# Patient Record
Sex: FEMALE | Race: WHITE | NOT HISPANIC OR LATINO | URBAN - METROPOLITAN AREA
[De-identification: names, ages, dates, MRNs, and addresses within clinical notes are randomized per-mention and may not be internally consistent; named-entity substitution may affect disease eponyms.]

---

## 2017-07-19 ENCOUNTER — HISTORICAL (OUTPATIENT)
Dept: ADMINISTRATIVE | Facility: HOSPITAL | Age: 32
End: 2017-07-19

## 2022-05-09 ENCOUNTER — OFFICE VISIT (OUTPATIENT)
Dept: ORTHOPEDICS | Facility: CLINIC | Age: 37
End: 2022-05-09
Payer: COMMERCIAL

## 2022-05-09 VITALS — BODY MASS INDEX: 30.82 KG/M2 | HEIGHT: 65 IN | WEIGHT: 185 LBS

## 2022-05-09 DIAGNOSIS — S52.572A OTHER CLOSED INTRA-ARTICULAR FRACTURE OF DISTAL END OF LEFT RADIUS, INITIAL ENCOUNTER: Primary | ICD-10-CM

## 2022-05-09 PROCEDURE — 3008F PR BODY MASS INDEX (BMI) DOCUMENTED: ICD-10-PCS | Mod: CPTII,,, | Performed by: ORTHOPAEDIC SURGERY

## 2022-05-09 PROCEDURE — 29085 APPL CAST HAND&LWR FOREARM: CPT | Mod: LT,,, | Performed by: ORTHOPAEDIC SURGERY

## 2022-05-09 PROCEDURE — 3008F BODY MASS INDEX DOCD: CPT | Mod: CPTII,,, | Performed by: ORTHOPAEDIC SURGERY

## 2022-05-09 PROCEDURE — 1159F PR MEDICATION LIST DOCUMENTED IN MEDICAL RECORD: ICD-10-PCS | Mod: CPTII,,, | Performed by: ORTHOPAEDIC SURGERY

## 2022-05-09 PROCEDURE — 1160F PR REVIEW ALL MEDS BY PRESCRIBER/CLIN PHARMACIST DOCUMENTED: ICD-10-PCS | Mod: CPTII,,, | Performed by: ORTHOPAEDIC SURGERY

## 2022-05-09 PROCEDURE — 1160F RVW MEDS BY RX/DR IN RCRD: CPT | Mod: CPTII,,, | Performed by: ORTHOPAEDIC SURGERY

## 2022-05-09 PROCEDURE — 1159F MED LIST DOCD IN RCRD: CPT | Mod: CPTII,,, | Performed by: ORTHOPAEDIC SURGERY

## 2022-05-09 PROCEDURE — 99203 OFFICE O/P NEW LOW 30 MIN: CPT | Mod: 25,,, | Performed by: ORTHOPAEDIC SURGERY

## 2022-05-09 PROCEDURE — 99203 PR OFFICE/OUTPT VISIT, NEW, LEVL III, 30-44 MIN: ICD-10-PCS | Mod: 25,,, | Performed by: ORTHOPAEDIC SURGERY

## 2022-05-09 PROCEDURE — 29085 PR APPLY HAND/WRIST CAST: ICD-10-PCS | Mod: LT,,, | Performed by: ORTHOPAEDIC SURGERY

## 2022-05-09 RX ORDER — NORETHINDRONE ACETATE AND ETHINYL ESTRADIOL AND FERROUS FUMARATE 1MG-20(21)
1 KIT ORAL DAILY
COMMUNITY
Start: 2022-03-07

## 2022-05-10 ENCOUNTER — PATIENT MESSAGE (OUTPATIENT)
Dept: ORTHOPEDICS | Facility: CLINIC | Age: 37
End: 2022-05-10
Payer: COMMERCIAL

## 2022-05-10 NOTE — PROGRESS NOTES
Subjective:    CC: Injury of the Left Wrist and Wrist Injury (pt fell last tuesday 5/3/22- went to [facility] urgent care and had xr done, pain level a 4 today, motrin prn)       HPI:  Patient presents to clinic for evaluation of left wrist pain.  Patient states that on 05/03/2022 she fell and injured her left wrist.  She went to the [place] urgent care the following day and had x-rays done that showed a minimally displaced comminuted intra-articular distal radius fracture.  She presents wearing a velcro brace.  Utilizing Motrin as needed.  She states she does still have some pain, bruising and swelling about the wrist and forearm.  She denies any numbness or tingling.  No previous surgeries or diagnoses to left wrist.    ROS: Refer to HPI for pertinent ROS. All other 12 point systems negative.    Objective:    There were no vitals filed for this visit.     Physical Exam:  Left upper extremity compartments are soft and warm.  Skin is intact.  There are no signs or symptoms of DVT or infection.  She does have some bruising about the ulnar side of the forearm as well as her distal radius fracture site.  Generalized swelling about the wrist and hand.  Tender to deep palpation about the distal radius; nontender about the ulna.  Nontender elsewhere about the hand or wrist.  She is able to flex, extend, supinate and pronate at the wrist with some expected discomfort.  Full range of motion at the elbow.  Able to flex and extend fingers appropriately.  Neurovascularly intact distally.    Images:  05/04/2022 urgent care Images Reviewed and discussed with patient.    Assessment:  1. Other closed intra-articular fracture of distal end of left radius, initial encounter       Plan:  Physical exam and imaging findings discussed with patient.  I believe she will heal well with conservative management.  We discussed continuing her velcro brace versus casting.  Patient would like to proceed with cast.  Short-arm cast applied.   Instructed patient to remain nonweightbearing and no heavy lifting.  Continue OTC pain medication as needed with appropriate precautions.  I would like to see the patient back in 2 weeks for cast removal with repeat x-rays and evaluation.  Patient will call with any problems or difficulties.    Follow up: Follow up in about 2 weeks (around 5/23/2022).

## 2022-05-10 NOTE — TELEPHONE ENCOUNTER
Spoke with pt. I rec she elevate arm and take OTC NSAIDs with approp precautions to address swelling. Pt denies persistent numbness tingling, sever pain, signs of decreased blood flow. Pt will call if symptoms worsen for cast removal.

## 2022-05-23 ENCOUNTER — HOSPITAL ENCOUNTER (OUTPATIENT)
Dept: RADIOLOGY | Facility: CLINIC | Age: 37
Discharge: HOME | End: 2022-05-23
Attending: ORTHOPAEDIC SURGERY
Payer: COMMERCIAL

## 2022-05-23 ENCOUNTER — OFFICE VISIT (OUTPATIENT)
Dept: ORTHOPEDICS | Facility: CLINIC | Age: 37
End: 2022-05-23
Payer: COMMERCIAL

## 2022-05-23 VITALS — WEIGHT: 185 LBS | HEIGHT: 65 IN | BODY MASS INDEX: 30.82 KG/M2

## 2022-05-23 DIAGNOSIS — S52.572D OTHER CLOSED INTRA-ARTICULAR FRACTURE OF DISTAL END OF LEFT RADIUS WITH ROUTINE HEALING, SUBSEQUENT ENCOUNTER: ICD-10-CM

## 2022-05-23 DIAGNOSIS — M25.532 LEFT WRIST PAIN: Primary | ICD-10-CM

## 2022-05-23 PROCEDURE — 1159F PR MEDICATION LIST DOCUMENTED IN MEDICAL RECORD: ICD-10-PCS | Mod: CPTII,,, | Performed by: ORTHOPAEDIC SURGERY

## 2022-05-23 PROCEDURE — 73110 X-RAY EXAM OF WRIST: CPT | Mod: ,,, | Performed by: ORTHOPAEDIC SURGERY

## 2022-05-23 PROCEDURE — 1159F MED LIST DOCD IN RCRD: CPT | Mod: CPTII,,, | Performed by: ORTHOPAEDIC SURGERY

## 2022-05-23 PROCEDURE — 99213 OFFICE O/P EST LOW 20 MIN: CPT | Mod: S$GLB,,, | Performed by: ORTHOPAEDIC SURGERY

## 2022-05-23 PROCEDURE — 1160F PR REVIEW ALL MEDS BY PRESCRIBER/CLIN PHARMACIST DOCUMENTED: ICD-10-PCS | Mod: CPTII,,, | Performed by: ORTHOPAEDIC SURGERY

## 2022-05-23 PROCEDURE — 3008F BODY MASS INDEX DOCD: CPT | Mod: CPTII,,, | Performed by: ORTHOPAEDIC SURGERY

## 2022-05-23 PROCEDURE — 73110 PR  X-RAY WRIST 3+ VW: ICD-10-PCS | Mod: ,,, | Performed by: ORTHOPAEDIC SURGERY

## 2022-05-23 PROCEDURE — 1160F RVW MEDS BY RX/DR IN RCRD: CPT | Mod: CPTII,,, | Performed by: ORTHOPAEDIC SURGERY

## 2022-05-23 PROCEDURE — 99213 PR OFFICE/OUTPT VISIT, EST, LEVL III, 20-29 MIN: ICD-10-PCS | Mod: S$GLB,,, | Performed by: ORTHOPAEDIC SURGERY

## 2022-05-23 PROCEDURE — 3008F PR BODY MASS INDEX (BMI) DOCUMENTED: ICD-10-PCS | Mod: CPTII,,, | Performed by: ORTHOPAEDIC SURGERY

## 2022-05-23 NOTE — PROGRESS NOTES
To our Subjective:    CC: Follow-up (l distal radius fx, pt stated said everything overall is pretty good)       HPI:  Patient returns today for repeat exam.  Patient is just over 2 weeks now from her left distal radius fracture.  She has been in a short-arm cast she states it is feeling better.    ROS: Refer to HPI for pertinent ROS. All other 12 point systems negative.    Objective:    Physical Exam:  Left upper extremity compartment soft and warm.  Skin is intact.  There is no signs symptoms of DVT or infection.  She is appropriately tender about her wrist.  There is no obvious swelling or erythema she has appropriate pronation supination flexion extension, she is neurovascular intact distally.    Images:  X-rays three views left wrist demonstrate a nondisplaced intra-articular distal radius fracture. Images Reviewed and discussed with patient.    Assessment:  1. Left wrist pain  - X-Ray Wrist Complete Left; Future  - X-Ray Wrist Complete Left    2. Other closed intra-articular fracture of distal end of left radius with routine healing, subsequent encounter        Plan:  At this time we discussed her physical examination findings.  She is healing nicely.  She will be nonweightbearing okay for motion she will be placed in a xo brace today.  I would like see back in 4 weeks repeat exam including x-rays.    Follow UP: Follow up in about 4 weeks (around 6/20/2022).

## 2022-06-20 ENCOUNTER — HOSPITAL ENCOUNTER (OUTPATIENT)
Dept: RADIOLOGY | Facility: CLINIC | Age: 37
Discharge: HOME | End: 2022-06-20
Attending: ORTHOPAEDIC SURGERY
Payer: COMMERCIAL

## 2022-06-20 ENCOUNTER — OFFICE VISIT (OUTPATIENT)
Dept: ORTHOPEDICS | Facility: CLINIC | Age: 37
End: 2022-06-20
Payer: COMMERCIAL

## 2022-06-20 VITALS — WEIGHT: 185 LBS | HEIGHT: 65 IN | BODY MASS INDEX: 30.82 KG/M2

## 2022-06-20 DIAGNOSIS — M25.532 LEFT WRIST PAIN: Primary | ICD-10-CM

## 2022-06-20 DIAGNOSIS — S52.572D OTHER CLOSED INTRA-ARTICULAR FRACTURE OF DISTAL END OF LEFT RADIUS WITH ROUTINE HEALING, SUBSEQUENT ENCOUNTER: ICD-10-CM

## 2022-06-20 PROCEDURE — 99213 PR OFFICE/OUTPT VISIT, EST, LEVL III, 20-29 MIN: ICD-10-PCS | Mod: ,,, | Performed by: ORTHOPAEDIC SURGERY

## 2022-06-20 PROCEDURE — 3008F BODY MASS INDEX DOCD: CPT | Mod: CPTII,,, | Performed by: ORTHOPAEDIC SURGERY

## 2022-06-20 PROCEDURE — 1160F RVW MEDS BY RX/DR IN RCRD: CPT | Mod: CPTII,,, | Performed by: ORTHOPAEDIC SURGERY

## 2022-06-20 PROCEDURE — 1160F PR REVIEW ALL MEDS BY PRESCRIBER/CLIN PHARMACIST DOCUMENTED: ICD-10-PCS | Mod: CPTII,,, | Performed by: ORTHOPAEDIC SURGERY

## 2022-06-20 PROCEDURE — 1159F MED LIST DOCD IN RCRD: CPT | Mod: CPTII,,, | Performed by: ORTHOPAEDIC SURGERY

## 2022-06-20 PROCEDURE — 3008F PR BODY MASS INDEX (BMI) DOCUMENTED: ICD-10-PCS | Mod: CPTII,,, | Performed by: ORTHOPAEDIC SURGERY

## 2022-06-20 PROCEDURE — 99213 OFFICE O/P EST LOW 20 MIN: CPT | Mod: ,,, | Performed by: ORTHOPAEDIC SURGERY

## 2022-06-20 PROCEDURE — 73110 X-RAY EXAM OF WRIST: CPT | Mod: LT,,, | Performed by: ORTHOPAEDIC SURGERY

## 2022-06-20 PROCEDURE — 1159F PR MEDICATION LIST DOCUMENTED IN MEDICAL RECORD: ICD-10-PCS | Mod: CPTII,,, | Performed by: ORTHOPAEDIC SURGERY

## 2022-06-20 PROCEDURE — 73110 XR WRIST COMPLETE 3 VIEWS LEFT: ICD-10-PCS | Mod: LT,,, | Performed by: ORTHOPAEDIC SURGERY

## 2022-06-20 NOTE — PROGRESS NOTES
Subjective:    CC: Follow-up (L DISTAL RADIUS FX F/U, JUST SOME MINOR PAIN NEAR THE KNUCKLES. )       HPI:  Patient returns today for repeat exam.  Patient is 6+ weeks from her left distal radius fracture.  She has become out of splint she states it feels much better she is without complaint.    ROS: Refer to HPI for pertinent ROS. All other 12 point systems negative.    Objective:    Physical Exam:  Left upper extremity compartment soft and warm.  Skin is intact.  There is no signs symptoms of DVT infection.  She is no longer tender on her distal radius.  She has some mild stiffness with wrist extension otherwise she has full pronation supination flexion extension.  She is stable to stressing there is no crepitance or instability, neurovascular intact distally    Images: .  X-rays three views left wrist demonstrate a healed distal radius fracture. Images Reviewed and discussed with patient.    Assessment:  1. Left wrist pain  - X-Ray Wrist Complete Left; Future  - X-Ray Wrist Complete Left    2. Other closed intra-articular fracture of distal end of left radius with routine healing, subsequent encounter        Plan:  At this time we discussed her physical exam x-ray findings.  She has healed nicely.  She will discontinue her brace weightbear as tolerated.  We have discussed formal therapy, she will start this on her own for now.  Patient understands call return sooner if there is any problems or difficulties.    Follow UP: Follow up if symptoms worsen or fail to improve.